# Patient Record
Sex: MALE | Race: WHITE | Employment: FULL TIME | URBAN - METROPOLITAN AREA
[De-identification: names, ages, dates, MRNs, and addresses within clinical notes are randomized per-mention and may not be internally consistent; named-entity substitution may affect disease eponyms.]

---
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
## 2020-04-26 ENCOUNTER — HOSPITAL ENCOUNTER (EMERGENCY)
Age: 20
Discharge: HOME | End: 2020-04-26
Attending: EMERGENCY MEDICINE

## 2020-04-26 VITALS
DIASTOLIC BLOOD PRESSURE: 63 MMHG | OXYGEN SATURATION: 99 % | SYSTOLIC BLOOD PRESSURE: 139 MMHG | HEIGHT: 68 IN | WEIGHT: 175 LBS | TEMPERATURE: 97.9 F | RESPIRATION RATE: 12 BRPM | BODY MASS INDEX: 26.52 KG/M2 | HEART RATE: 88 BPM

## 2020-04-26 PROCEDURE — 6360000002 HC RX W HCPCS: Performed by: EMERGENCY MEDICINE

## 2020-04-26 PROCEDURE — 90471 IMMUNIZATION ADMIN: CPT | Performed by: EMERGENCY MEDICINE

## 2020-04-26 PROCEDURE — 99282 EMERGENCY DEPT VISIT SF MDM: CPT

## 2020-04-26 PROCEDURE — 90715 TDAP VACCINE 7 YRS/> IM: CPT | Performed by: EMERGENCY MEDICINE

## 2020-04-26 PROCEDURE — 6370000000 HC RX 637 (ALT 250 FOR IP): Performed by: EMERGENCY MEDICINE

## 2020-04-26 RX ORDER — ACETAMINOPHEN 500 MG
1000 TABLET ORAL ONCE
Status: COMPLETED | OUTPATIENT
Start: 2020-04-26 | End: 2020-04-26

## 2020-04-26 RX ADMIN — TETANUS TOXOID, REDUCED DIPHTHERIA TOXOID AND ACELLULAR PERTUSSIS VACCINE, ADSORBED 0.5 ML: 5; 2.5; 8; 8; 2.5 SUSPENSION INTRAMUSCULAR at 13:26

## 2020-04-26 RX ADMIN — ACETAMINOPHEN 1000 MG: 500 TABLET ORAL at 13:26

## 2020-04-26 ASSESSMENT — PAIN DESCRIPTION - ORIENTATION: ORIENTATION: RIGHT

## 2020-04-26 ASSESSMENT — PAIN DESCRIPTION - PAIN TYPE: TYPE: ACUTE PAIN

## 2020-04-26 ASSESSMENT — PAIN SCALES - GENERAL: PAINLEVEL_OUTOF10: 8

## 2020-04-26 NOTE — ED PROVIDER NOTES
CHIEF COMPLAINT  Laceration      HISTORY OF PRESENT ILLNESS  Herrera Harmon is a 22 y.o. male, who presents to the ED with a laceration to the right thumb which was sustained when he accidentally cut himself while using a mandoline when slicing squash. Patient states pain in the digit is 7/10 in severity. Patient also noted significant bleeding despite use of pressure dressing and makeshift tourniquet. No numbness no weakness. Denies other injuries. Of note the patient placed a makeshift tourniquet with string and a rubber band at the base of the thumb, and a pressure dressing to the area of the wound. No other complaints,modifying factors or associated symptoms. Review of Systems    I have reviewed the following from the nursing documentation. Past Medical History:   Diagnosis Date    Lipid storage disorder      History reviewed. No pertinent surgical history. History reviewed. No pertinent family history.   Social History     Socioeconomic History    Marital status: Single     Spouse name: Not on file    Number of children: Not on file    Years of education: Not on file    Highest education level: Not on file   Occupational History    Not on file   Social Needs    Financial resource strain: Not on file    Food insecurity     Worry: Not on file     Inability: Not on file    Transportation needs     Medical: Not on file     Non-medical: Not on file   Tobacco Use    Smoking status: Current Every Day Smoker     Types: Cigars    Smokeless tobacco: Never Used   Substance and Sexual Activity    Alcohol use: No    Drug use: No    Sexual activity: Not on file   Lifestyle    Physical activity     Days per week: Not on file     Minutes per session: Not on file    Stress: Not on file   Relationships    Social connections     Talks on phone: Not on file     Gets together: Not on file     Attends Christianity service: Not on file     Active member of club or organization: Not on file     Attends meetings of clubs or organizations: Not on file     Relationship status: Not on file    Intimate partner violence     Fear of current or ex partner: Not on file     Emotionally abused: Not on file     Physically abused: Not on file     Forced sexual activity: Not on file   Other Topics Concern    Not on file   Social History Narrative    Not on file        No current facility-administered medications for this encounter. No current outpatient medications on file. No Known Allergies  [unfilled]       PHYSICAL EXAM  /63   Pulse 88   Temp 97.9 °F (36.6 °C) (Oral)   Resp 12   Ht 5' 8\" (1.727 m)   Wt 79.4 kg (175 lb)   SpO2 99%   BMI 26.61 kg/m²   GENERAL APPEARANCE: Awake and alert. Cooperative. In no acute distress. EXTREMITIES: Examination of the right thumb shows a 1.2 x 0.8 cm avulsion laceration to the subcutaneous tissue on the radial side of the distal phalanx with minimal involvement of the lateral nail and nailbed. No bony or joint involvement. There is mild active venous bleeding. There is full strength and range of motion. Sensation is intact. Capillary refill is normal.  Of note as well is a circumferential superficial abrasion at the base of the thumb with surrounding erythema. No foreign body. SKIN: Warm and dry. Physical Exam    LABORATORY STUDIES:  Labs Reviewed - No data to display     RADIOLOGY  No orders to display       PROCEDURES  Procedures    ED COURSE/MDM  Patient seen and evaluated. Old records reviewed if pertinent. Labs and imaging reviewed and results discussed withpatient. I considered avulsion laceration without bony or joint injury, circumferential abrasion secondary to rubber band tourniquet placed by patient. No apparent bony injury. No foreign body. The wound was irrigated with sterile water. An Adaptic, gauze and  Coban pressure dressing was applied.   The patient was observed for approximately 20 minutes with no evidence of significant rebleeding

## 2020-04-28 ENCOUNTER — HOSPITAL ENCOUNTER (EMERGENCY)
Age: 20
Discharge: HOME | End: 2020-04-28

## 2020-04-28 VITALS
HEART RATE: 70 BPM | RESPIRATION RATE: 15 BRPM | BODY MASS INDEX: 26.27 KG/M2 | TEMPERATURE: 98.3 F | DIASTOLIC BLOOD PRESSURE: 68 MMHG | WEIGHT: 172.8 LBS | OXYGEN SATURATION: 100 % | SYSTOLIC BLOOD PRESSURE: 138 MMHG

## 2020-04-28 PROCEDURE — 99282 EMERGENCY DEPT VISIT SF MDM: CPT

## 2020-04-28 NOTE — ED PROVIDER NOTES
history. CURRENT MEDICATIONS       Previous Medications    No medications on file       ALLERGIES     Patient has no known allergies. FAMILY HISTORY     History reviewed. No pertinent family history. Family Status   Relation Name Status    Mother  Alive        SOCIAL HISTORY      reports that he has been smoking cigars. He has never used smokeless tobacco. He reports that he does not drink alcohol or use drugs. PHYSICAL EXAM    (up to 7 for level 4, 8 or more for level 5)     ED Triage Vitals [04/28/20 1507]   BP Temp Temp Source Pulse Resp SpO2 Height Weight   138/68 98.3 °F (36.8 °C) Oral 70 15 100 % -- 172 lb 12.8 oz (78.4 kg)       Constitutional:  Appearing well-developed and well-nourished. No distress. HENT:  Normocephalic and atraumatic. Cardiovascular:  Normal rate, regular rhythm, normal heart sounds and intact distal pulses. Pulmonary/Chest:  Effort normal and breath sounds normal. No respiratory distress. Musculoskeletal: Approximately 2 cm a avulsion laceration, oval-shaped, noted to the radial aspect of the distal right thumb, no nail involvement, appearing well-healing with no active bleeding, no significant swelling. Normal examination of the right hand otherwise. Sensation to light touch grossly intact and capillary refill <3 seconds in the right thumb. Neurological:  Oriented to person, place, and time. No cranial nerve deficit. Skin:  Skin is warm and dry. Not diaphoretic. Psychiatric:  Normal mood, affect, behavior, judgment and thought content. DIAGNOSTIC RESULTS     RADIOLOGY:   Non-plain film images such as CT, Ultrasound and MRI are read by the radiologist. Plain radiographic images are visualized and preliminarily interpreted by ANGELES Loco with the below findings:    None.     Interpretation per the Radiologist below, if available at the time of this note:    No orders to display       LABS:  Labs Reviewed - No data to display    All other labs were within normal range or not returned as of this dictation. EMERGENCY DEPARTMENT COURSE and DIFFERENTIAL DIAGNOSIS/MDM:   Vitals:    Vitals:    04/28/20 1507   BP: 138/68   Pulse: 70   Resp: 15   Temp: 98.3 °F (36.8 °C)   TempSrc: Oral   SpO2: 100%   Weight: 78.4 kg (172 lb 12.8 oz)       The patient's condition in the ED was good, the patient was afebrile and nontoxic in appearance, and the patient's physical exam was unremarkable. His avulsion laceration appears to be healing well, with no bleeding after removal of dressing. There was no indication for hospitalization or workup. Patient wound was dressed with gauze and he will be discharged with instructions to keep it covered for several more days until wound appears well-healed and pain is significantly improved. He will be given contact information to obtain a family doctor if needed. The patient verbalized understanding and agreement with this plan of care. The patient was advised to return to the emergency department if symptoms should significantly worsen or if new and concerning symptoms should appear. I estimate there is LOW risk for FRACTURE, COMPARTMENT SYNDROME, DEEP VENOUS THROMBOSIS, SEPTIC ARTHRITIS, TENDON OR NEUROVASCULAR INJURY, thus I consider the discharge disposition reasonable. PROCEDURES:  None    FINAL IMPRESSION      1.  Encounter for wound re-check          DISPOSITION/PLAN   DISPOSITION Decision To Discharge 04/28/2020 03:11:36 PM      PATIENT REFERRED TO:  [facility]  [phone]  Call   to get a family doctor, if needed      DISCHARGE MEDICATIONS:  New Prescriptions    No medications on file       (Please note that portions of this note were completed with a voice recognition program.  Efforts were made to edit the dictations but occasionally words are mis-transcribed.)    Tigre Campos, [address] [place] [address], [address] [place] [address] Christian  04/28/20 [id number]

## 2020-04-29 ENCOUNTER — CARE COORDINATION (OUTPATIENT)
Dept: CARE COORDINATION | Age: 20
End: 2020-04-29

## 2020-04-30 ENCOUNTER — CARE COORDINATION (OUTPATIENT)
Dept: CARE COORDINATION | Age: 20
End: 2020-04-30

## 2020-04-30 NOTE — CARE COORDINATION
Outreach call made and no answer. Left message with ACM contact information. This is 2nd attempt at out reach. No further out reach will be made unless patient returns call.